# Patient Record
Sex: FEMALE | Race: WHITE | URBAN - METROPOLITAN AREA
[De-identification: names, ages, dates, MRNs, and addresses within clinical notes are randomized per-mention and may not be internally consistent; named-entity substitution may affect disease eponyms.]

---

## 2017-09-27 ENCOUNTER — OFFICE VISIT (OUTPATIENT)
Dept: PODIATRY | Facility: CLINIC | Age: 82
End: 2017-09-27
Payer: COMMERCIAL

## 2017-09-27 VITALS
HEIGHT: 60 IN | WEIGHT: 130.6 LBS | DIASTOLIC BLOOD PRESSURE: 81 MMHG | BODY MASS INDEX: 25.64 KG/M2 | SYSTOLIC BLOOD PRESSURE: 149 MMHG | HEART RATE: 85 BPM

## 2017-09-27 DIAGNOSIS — B35.1 ONYCHOMYCOSIS: Primary | ICD-10-CM

## 2017-09-27 PROCEDURE — 99203 OFFICE O/P NEW LOW 30 MIN: CPT | Performed by: PODIATRIST

## 2017-09-27 RX ORDER — LORAZEPAM 0.5 MG/1
TABLET ORAL
COMMUNITY
Start: 2017-09-12

## 2017-09-27 RX ORDER — METOPROLOL SUCCINATE 25 MG/1
TABLET, EXTENDED RELEASE ORAL
COMMUNITY
Start: 2017-09-12

## 2017-09-27 RX ORDER — AMLODIPINE BESYLATE 5 MG/1
TABLET ORAL
COMMUNITY
Start: 2017-01-26

## 2017-09-27 RX ORDER — MIRTAZAPINE 15 MG/1
7.5 TABLET, FILM COATED ORAL
COMMUNITY
Start: 2016-12-29 | End: 2017-12-29

## 2017-09-27 RX ORDER — CITALOPRAM HYDROBROMIDE 20 MG/1
TABLET ORAL
COMMUNITY
Start: 2017-03-09

## 2017-09-27 NOTE — MR AVS SNAPSHOT
After Visit Summary   9/27/2017    Carly Buckley    MRN: [id number]           Patient Information     Date Of Birth          4/5/1930        Visit Information        Provider Department      9/27/2017 1:00 PM Berto Stewart DPM [facility] [place]    FOOT CARE NURSES  If you are interested in having a foot care nurse come out to your   home, please call one of these contacts for more information:  [facility]  [phone] [facility]  [phone]   [facility]  [phone]  [facility] [phone]  [place]   [place] [facility]  [phone]  At [facility] [phone]       NAIL FUNGUS / ONYCHOMYCOSIS   Nail fungus is not a hygiene problem and will not likely lead to significant medical   problems. The nails may get thick causing pain and possibly local skin infection.   Treatments include debridement (trimming), oral antifungals, topical antifungals and complete removal of the nail. Most fungal nails are not treated.   Topicals such as tea tree oil can be helpful for surface fungus and may, at best, limit   progression. Over the counter creams (such as Lamisil) can also be used however, their effectiveness is also quite low.  Topical treatment with Pen lac is expensive and often not covered by insurance. Pen lac has an approximate 8% success rate. Topical therapy recommendations is to apply twice a day for at least 3-4 months as it takes 9 months for new nail to grow out.    Experts suggest soaking your feet for 15 to 20 minutes in a mixture of 1 cup vinegar to 4 cups warm water. Be sure to rinse well and pat your feet dry when you're done. You can soak your feet like this daily. But if your skin becomes irritated, try soaking only two to three times a week. Vicks VapoRub, as with vinegar, there have been no controlled clinical trials to assess the effectiveness of Vicks VapoRub  "on nail fungus, but there have been numerous anecdotal reports that it works. There's no consensus on how often to apply this product, so check with your doctor before using it on your nails.     Oral therapies include Sporanox and Lamisil. Oral therapies are also expensive and not very effective. Side effects such as liver disease are the main concern. Return of fungus is common even if the treatment worked.     Other Tips:  - Penlac nail medication apply daily x 4 months; remove old polish first day of each week  - Antifungal cream/powder (Zeasorb) - apply daily to feet and shoes x 2 months  - Clean shoes with Lysol or in washing machine every few weeks  - Rotate shoe gear; give them 24 hours to dry out between days wearing them  - Clean pair of socks in morning, clean pair in afternoon if your feet sweat  - Shower shoes used in public showers/pools            Follow-ups after your visit        Who to contact     If you have questions or need follow up information about today's clinic visit or your schedule please contact [facility] directly at [phone].  Normal or non-critical lab and imaging results will be communicated to you by [employer], letter or phone within 4 business days after the clinic has received the results. If you do not hear from us within 7 days, please contact the clinic through [employer] or phone. If you have a critical or abnormal lab result, we will notify you by phone as soon as possible.  Submit refill requests through [employer] or call your pharmacy and they will forward the refill request to us. Please allow 3 business days for your refill to be completed.          Additional Information About Your Visit        [employer] Information     [employer] lets you send messages to your doctor, view your test results, renew your prescriptions, schedule appointments and more. To sign up, go to [email or web address] . Click on \"Log in\" on the left side of the screen, which will take " "you to the Welcome page. Then click on \"Sign up Now\" on the right side of the page.     You will be asked to enter the access code listed below, as well as some personal information. Please follow the directions to create your username and password.     Your access code is: [email or web address]  Expires: 2017  1:34 PM     Your access code will  in 90 days. If you need help or a new code, please call your [place] clinic or [phone].        Care EveryWhere ID     This is your Care EveryWhere ID. This could be used by other organizations to access your [place] medical records  [id number]        Your Vitals Were     Pulse Height BMI (Body Mass Index)             85 5' (1.524 m) 25.51 kg/m2          Blood Pressure from Last 3 Encounters:   17 149/81   09 92/60   09 108/60    Weight from Last 3 Encounters:   17 130 lb 9.6 oz (59.2 kg)   01/13/10 152 lb (68.9 kg)   09 151 lb 9.6 oz (68.8 kg)              Today, you had the following     No orders found for display       Primary Care Provider Office Phone # Fax #    [facility] JOHN Contreras [phone]       XXX NO INFO FOUND XXX  XXX [place] [address]        Equal Access to Services     [facility] : Hadii lucian ku hadlaineyo Somaeve, waaxda luqadaha, qaybta kaalmada asher, carolann clements . So [facility] [phone].    ATENCIÓN: Si habla español, tiene a jones disposición servicios gratuitos de asistencia lingüística. Amina al [phone].    We comply with applicable federal civil rights laws and [place] laws. We do not discriminate on the basis of race, color, national origin, age, disability sex, sexual orientation or gender identity.            Thank you!     Thank you for choosing [facility]  for your care. Our goal is always to provide you with excellent care. Hearing back from our patients is one way we can continue to improve our services. Please take a few minutes to complete the " written survey that you may receive in the mail after your visit with us. Thank you!             Your Updated Medication List - Protect others around you: Learn how to safely use, store and throw away your medicines at www.disposemymeds.org.          This list is accurate as of: 9/27/17  1:34 PM.  Always use your most recent med list.                   Brand Name Dispense Instructions for use Diagnosis    amLODIPine 5 MG tablet    NORVASC     TAKE 1 TABLET DAILY        aspirin 81 MG tablet      Take one tablet by mouth every day.        CALCIUM + D PO      1 tablet daily        citalopram 20 MG tablet    celeXA     TAKE ONE TABLET BY MOUTH EVERY DAY        CLONIDINE HCL PO      1 tablet daily        LORazepam 0.5 MG tablet    ATIVAN     1 tab up to twice daily as needed for anxiety and insomnia        metoprolol 25 MG 24 hr tablet    TOPROL-XL     TAKE 1 TABLET DAILY        mirtazapine 15 MG tablet    REMERON     Take 7.5 mg by mouth        PRILOSEC PO      1 tablet daily        ZOCOR PO      1 tablet daily

## 2017-09-27 NOTE — LETTER
9/27/2017         RE: Carly Buckley  [address]  [place] [address]        Dear Colleague,    Thank you for referring your patient, Carly Buckley, to the [facility]. Please see a copy of my visit note below.    PATIENT HISTORY:  Carly Buckley is a 87 year old female who presents to clinic for a painful thick toenail on right 1st toe.  Painful only in shoes.  Her shoes are quite tight.  4-5/10 pain.  She has access to foot RN care services but hasn't tried them yet.  Hx of R hallux nail removal in 2009 with Dr. Simpson.  Since then the nail has grown back even thicker.  No other treatments.  6 months of increased pain.      Review of Systems:  Patient denies fever, chills, rash, wound, stiffness, numbness, weakness, heart burn, blood in stool, chest pain with activity, calf pain when walking, chronic cough, easy bleeding/bruising, swelling of ankles, excessive thirst.  Patient admits to limping, SOB with activity, fatigue, depression, anxiety.     PAST MEDICAL HISTORY:   HTN, Alcohol dependency, anxiety & depression, arthritis, breast Ca    PAST SURGICAL HISTORY:  Nail removal in 2009     MEDICATIONS:   Current Outpatient Prescriptions:      metoprolol (TOPROL-XL) 25 MG 24 hr tablet, TAKE 1 TABLET DAILY, Disp: , Rfl:      amLODIPine (NORVASC) 5 MG tablet, TAKE 1 TABLET DAILY, Disp: , Rfl:      citalopram (CELEXA) 20 MG tablet, TAKE ONE TABLET BY MOUTH EVERY DAY, Disp: , Rfl:      aspirin 81 MG tablet, Take one tablet by mouth every day. , Disp: , Rfl:      LORazepam (ATIVAN) 0.5 MG tablet, 1 tab up to twice daily as needed for anxiety and insomnia, Disp: , Rfl:      mirtazapine (REMERON) 15 MG tablet, Take 7.5 mg by mouth, Disp: , Rfl:      CLONIDINE HCL OR, 1 tablet daily, Disp: , Rfl:      PRILOSEC OR, 1 tablet daily, Disp: , Rfl:      ZOCOR OR, 1 tablet daily, Disp: , Rfl:      CALCIUM + D OR, 1 tablet daily, Disp: , Rfl:      ALLERGIES:    Allergies   Allergen Reactions      Tetanus Toxoid         SOCIAL HISTORY:   Social History     Social History     Marital status:      Spouse name: N/A     Number of children: N/A     Years of education: N/A     Occupational History     Not on file.     Social History Main Topics     Smoking status: Never Smoker     Smokeless tobacco: Not on file     Alcohol use No      Comment: not since 1968     Drug use: No     Sexual activity: Not on file     Other Topics Concern     Not on file     Social History Narrative        FAMILY HISTORY: No pertinent family history.     EXAM:Vitals: /81 (BP Location: Left arm, Patient Position: Chair, Cuff Size: Adult Regular)  Pulse 85  Ht 5' (1.524 m)  Wt 130 lb 9.6 oz (59.2 kg)  BMI 25.51 kg/m2  BMI= Body mass index is 25.51 kg/(m^2).    General appearance: Patient is alert and fully cooperative with history & exam.  No sign of distress is noted during the visit.     Psychiatric: Affect is pleasant & appropriate.  Patient appears motivated to improve health.     Respiratory: Breathing is regular & unlabored while sitting.     HEENT: Hearing is intact to spoken word.  Speech is clear.  No gross evidence of visual impairment that would impact ambulation.    Pt declined L foot exam.     Dermatologic: Right toenails 1-5 thickened, dystrophic, discolored.  Hallux nail extremely thick.  3rd toenail curving back nearly penetrating skin.  No paronychia or evidence of soft tissue infection is noted.     Vascular: DP & PT pulses are intact & regular on the right.  No significant edema or varicosities noted.  CFT and skin temperature are normal.     Neurologic: Lower extremity sensation is intact to light touch.  No evidence of weakness or contracture in the lower extremities.  No evidence of neuropathy.     Musculoskeletal: Patient is ambulatory.  No gross ankle deformity noted.  No foot or ankle joint effusion is noted.     ASSESSMENT: Onychomycosis     PLAN:  Reviewed patient's chart in epic.  Discussed  condition and treatment options including pros and cons.    Discussed causes of nail fungus.  Discussed treatment options with patient and explained that there isn't one treatment that is 100% effective.  Debridement is an option.  Discussed oral lamisil which is the most effective can have liver effects.  Discussed over the counter antifungal creams.  Explained that these are less effective and need to be applied twice a day for about 3-4 months.  Also talked about prescription topicals, which have similar efficacy.  Also discussed that if there was damage to the nail and the nail is now dystrophic that none of the above is going to change the nail.  Discussed permanent nail removal is an option.  Discussed risk of infection, wound healing issues.    Debridement was offered.  Discussed I normally don't do routine foot care.  She decided to get nails debrided by foot care RN since she is not sure if this is covered for her.  List of routine care options given.  I did debride the right 3rd toenail with a nail nipper to prevent an ulceration here.  No charge.  Roomier shoes advised.    F/u prn.    Handouts given.    Berto Stewart DPM, FACFAS      BMI is normal.    STORMY Carrasquillo MA September 27, 2017 1:18 PM      Again, thank you for allowing me to participate in the care of your patient.        Sincerely,        Berto Stewart DPM

## 2017-09-27 NOTE — NURSING NOTE
Chief Complaint   Patient presents with     Toenail     R 1st toenail        Initial /81 (BP Location: Left arm, Patient Position: Chair, Cuff Size: Adult Regular)  Pulse 85  Ht 5' (1.524 m)  Wt 130 lb 9.6 oz (59.2 kg)  BMI 25.51 kg/m2 Estimated body mass index is 25.51 kg/(m^2) as calculated from the following:    Height as of this encounter: 5' (1.524 m).    Weight as of this encounter: 130 lb 9.6 oz (59.2 kg).  Medication Reconciliation: unable or not appropriate to perform    ALe Carrasquillo MA September 27, 2017 1:18 PM

## 2017-09-27 NOTE — PATIENT INSTRUCTIONS
FOOT CARE NURSES  If you are interested in having a foot care nurse come out to your   home, please call one of these contacts for more information:  [facility]  [phone] [facility]  [phone]   [facility]  [phone]  [facility] [phone]  [place]   [place] [facility]  [phone]  At [facility] [phone]       NAIL FUNGUS / ONYCHOMYCOSIS   Nail fungus is not a hygiene problem and will not likely lead to significant medical   problems. The nails may get thick causing pain and possibly local skin infection.   Treatments include debridement (trimming), oral antifungals, topical antifungals and complete removal of the nail. Most fungal nails are not treated.   Topicals such as tea tree oil can be helpful for surface fungus and may, at best, limit   progression. Over the counter creams (such as Lamisil) can also be used however, their effectiveness is also quite low.  Topical treatment with Pen lac is expensive and often not covered by insurance. Pen lac has an approximate 8% success rate. Topical therapy recommendations is to apply twice a day for at least 3-4 months as it takes 9 months for new nail to grow out.    Experts suggest soaking your feet for 15 to 20 minutes in a mixture of 1 cup vinegar to 4 cups warm water. Be sure to rinse well and pat your feet dry when you're done. You can soak your feet like this daily. But if your skin becomes irritated, try soaking only two to three times a week. Vicks VapoRub, as with vinegar, there have been no controlled clinical trials to assess the effectiveness of Vicks VapoRub on nail fungus, but there have been numerous anecdotal reports that it works. There's no consensus on how often to apply this product, so check with your doctor before using it on your nails.     Oral therapies include Sporanox and Lamisil. Oral therapies are also expensive and not very effective. Side  effects such as liver disease are the main concern. Return of fungus is common even if the treatment worked.     Other Tips:  - Penlac nail medication apply daily x 4 months; remove old polish first day of each week  - Antifungal cream/powder (Zeasorb) - apply daily to feet and shoes x 2 months  - Clean shoes with Lysol or in washing machine every few weeks  - Rotate shoe gear; give them 24 hours to dry out between days wearing them  - Clean pair of socks in morning, clean pair in afternoon if your feet sweat  - Shower shoes used in public showers/pools

## 2017-09-27 NOTE — PROGRESS NOTES
PATIENT HISTORY:  Carly Buckley is a 87 year old female who presents to clinic for a painful thick toenail on right 1st toe.  Painful only in shoes.  Her shoes are quite tight.  4-5/10 pain.  She has access to foot RN care services but hasn't tried them yet.  Hx of R hallux nail removal in 2009 with Dr. Simpson.  Since then the nail has grown back even thicker.  No other treatments.  6 months of increased pain.      Review of Systems:  Patient denies fever, chills, rash, wound, stiffness, numbness, weakness, heart burn, blood in stool, chest pain with activity, calf pain when walking, chronic cough, easy bleeding/bruising, swelling of ankles, excessive thirst.  Patient admits to limping, SOB with activity, fatigue, depression, anxiety.     PAST MEDICAL HISTORY:   HTN, Alcohol dependency, anxiety & depression, arthritis, breast Ca    PAST SURGICAL HISTORY:  Nail removal in 2009     MEDICATIONS:   Current Outpatient Prescriptions:      metoprolol (TOPROL-XL) 25 MG 24 hr tablet, TAKE 1 TABLET DAILY, Disp: , Rfl:      amLODIPine (NORVASC) 5 MG tablet, TAKE 1 TABLET DAILY, Disp: , Rfl:      citalopram (CELEXA) 20 MG tablet, TAKE ONE TABLET BY MOUTH EVERY DAY, Disp: , Rfl:      aspirin 81 MG tablet, Take one tablet by mouth every day. , Disp: , Rfl:      LORazepam (ATIVAN) 0.5 MG tablet, 1 tab up to twice daily as needed for anxiety and insomnia, Disp: , Rfl:      mirtazapine (REMERON) 15 MG tablet, Take 7.5 mg by mouth, Disp: , Rfl:      CLONIDINE HCL OR, 1 tablet daily, Disp: , Rfl:      PRILOSEC OR, 1 tablet daily, Disp: , Rfl:      ZOCOR OR, 1 tablet daily, Disp: , Rfl:      CALCIUM + D OR, 1 tablet daily, Disp: , Rfl:      ALLERGIES:    Allergies   Allergen Reactions     Tetanus Toxoid         SOCIAL HISTORY:   Social History     Social History     Marital status:      Spouse name: N/A     Number of children: N/A     Years of education: N/A     Occupational History     Not on file.     Social History Main Topics      Smoking status: Never Smoker     Smokeless tobacco: Not on file     Alcohol use No      Comment: not since 1968     Drug use: No     Sexual activity: Not on file     Other Topics Concern     Not on file     Social History Narrative        FAMILY HISTORY: No pertinent family history.     EXAM:Vitals: /81 (BP Location: Left arm, Patient Position: Chair, Cuff Size: Adult Regular)  Pulse 85  Ht 5' (1.524 m)  Wt 130 lb 9.6 oz (59.2 kg)  BMI 25.51 kg/m2  BMI= Body mass index is 25.51 kg/(m^2).    General appearance: Patient is alert and fully cooperative with history & exam.  No sign of distress is noted during the visit.     Psychiatric: Affect is pleasant & appropriate.  Patient appears motivated to improve health.     Respiratory: Breathing is regular & unlabored while sitting.     HEENT: Hearing is intact to spoken word.  Speech is clear.  No gross evidence of visual impairment that would impact ambulation.    Pt declined L foot exam.     Dermatologic: Right toenails 1-5 thickened, dystrophic, discolored.  Hallux nail extremely thick.  3rd toenail curving back nearly penetrating skin.  No paronychia or evidence of soft tissue infection is noted.     Vascular: DP & PT pulses are intact & regular on the right.  No significant edema or varicosities noted.  CFT and skin temperature are normal.     Neurologic: Lower extremity sensation is intact to light touch.  No evidence of weakness or contracture in the lower extremities.  No evidence of neuropathy.     Musculoskeletal: Patient is ambulatory.  No gross ankle deformity noted.  No foot or ankle joint effusion is noted.     ASSESSMENT: Onychomycosis     PLAN:  Reviewed patient's chart in epic.  Discussed condition and treatment options including pros and cons.    Discussed causes of nail fungus.  Discussed treatment options with patient and explained that there isn't one treatment that is 100% effective.  Debridement is an option.  Discussed oral lamisil  which is the most effective can have liver effects.  Discussed over the counter antifungal creams.  Explained that these are less effective and need to be applied twice a day for about 3-4 months.  Also talked about prescription topicals, which have similar efficacy.  Also discussed that if there was damage to the nail and the nail is now dystrophic that none of the above is going to change the nail.  Discussed permanent nail removal is an option.  Discussed risk of infection, wound healing issues.    Debridement was offered.  Discussed I normally don't do routine foot care.  She decided to get nails debrided by foot care RN since she is not sure if this is covered for her.  List of routine care options given.  I did debride the right 3rd toenail with a nail nipper to prevent an ulceration here.  No charge.  Roomier shoes advised.    F/u prn.    Handouts given.    Berto Stewart, JUANITO, FACFAS

## 2022-01-01 ENCOUNTER — LAB REQUISITION (OUTPATIENT)
Dept: LAB | Facility: CLINIC | Age: 87
End: 2022-01-01
Payer: COMMERCIAL

## 2022-01-01 DIAGNOSIS — I10 ESSENTIAL (PRIMARY) HYPERTENSION: ICD-10-CM

## 2022-01-01 DIAGNOSIS — G30.9 ALZHEIMER'S DISEASE, UNSPECIFIED (CODE) (H): ICD-10-CM

## 2022-01-01 DIAGNOSIS — R30.0 DYSURIA: ICD-10-CM

## 2022-01-01 LAB
ALBUMIN SERPL BCG-MCNC: 3.6 G/DL (ref 3.5–5.2)
ALBUMIN UR-MCNC: 300 MG/DL
ALP SERPL-CCNC: 58 U/L (ref 35–104)
ALT SERPL W P-5'-P-CCNC: 15 U/L (ref 10–35)
ANION GAP SERPL CALCULATED.3IONS-SCNC: 11 MMOL/L (ref 7–15)
APPEARANCE UR: CLEAR
AST SERPL W P-5'-P-CCNC: 34 U/L (ref 10–35)
BILIRUB SERPL-MCNC: 0.3 MG/DL
BILIRUB UR QL STRIP: NEGATIVE
BUN SERPL-MCNC: 33.7 MG/DL (ref 8–23)
CALCIUM SERPL-MCNC: 9.2 MG/DL (ref 8.2–9.6)
CHLORIDE SERPL-SCNC: 105 MMOL/L (ref 98–107)
COLOR UR AUTO: ABNORMAL
CREAT SERPL-MCNC: 1.34 MG/DL (ref 0.51–0.95)
DEPRECATED CALCIDIOL+CALCIFEROL SERPL-MC: 27 UG/L (ref 20–75)
DEPRECATED HCO3 PLAS-SCNC: 23 MMOL/L (ref 22–29)
ERYTHROCYTE [DISTWIDTH] IN BLOOD BY AUTOMATED COUNT: 13.9 % (ref 10–15)
GFR SERPL CREATININE-BSD FRML MDRD: 37 ML/MIN/1.73M2
GLUCOSE SERPL-MCNC: 76 MG/DL (ref 70–99)
GLUCOSE UR STRIP-MCNC: NEGATIVE MG/DL
HCT VFR BLD AUTO: 36.9 % (ref 35–47)
HGB BLD-MCNC: 11.9 G/DL (ref 11.7–15.7)
HGB UR QL STRIP: NEGATIVE
HYALINE CASTS: 1 /LPF
KETONES UR STRIP-MCNC: NEGATIVE MG/DL
LEUKOCYTE ESTERASE UR QL STRIP: NEGATIVE
MCH RBC QN AUTO: 30.7 PG (ref 26.5–33)
MCHC RBC AUTO-ENTMCNC: 32.2 G/DL (ref 31.5–36.5)
MCV RBC AUTO: 95 FL (ref 78–100)
MUCOUS THREADS #/AREA URNS LPF: PRESENT /LPF
NITRATE UR QL: NEGATIVE
PH UR STRIP: 6.5 [PH] (ref 5–7)
PLATELET # BLD AUTO: 227 10E3/UL (ref 150–450)
POTASSIUM SERPL-SCNC: 3.9 MMOL/L (ref 3.4–5.3)
PROT SERPL-MCNC: 6.9 G/DL (ref 6.4–8.3)
RBC # BLD AUTO: 3.88 10E6/UL (ref 3.8–5.2)
RBC URINE: 1 /HPF
SODIUM SERPL-SCNC: 139 MMOL/L (ref 136–145)
SP GR UR STRIP: 1.02 (ref 1–1.03)
SQUAMOUS EPITHELIAL: 2 /HPF
TSH SERPL DL<=0.005 MIU/L-ACNC: 2.5 UIU/ML (ref 0.3–4.2)
UROBILINOGEN UR STRIP-MCNC: NORMAL MG/DL
VIT B12 SERPL-MCNC: 341 PG/ML (ref 232–1245)
WBC # BLD AUTO: 4.3 10E3/UL (ref 4–11)
WBC URINE: 3 /HPF

## 2022-01-01 PROCEDURE — 81001 URINALYSIS AUTO W/SCOPE: CPT | Mod: ORL | Performed by: FAMILY MEDICINE

## 2022-01-01 PROCEDURE — 82607 VITAMIN B-12: CPT | Mod: ORL | Performed by: FAMILY MEDICINE

## 2022-01-01 PROCEDURE — 84443 ASSAY THYROID STIM HORMONE: CPT | Mod: ORL | Performed by: FAMILY MEDICINE

## 2022-01-01 PROCEDURE — 85027 COMPLETE CBC AUTOMATED: CPT | Mod: ORL | Performed by: FAMILY MEDICINE

## 2022-01-01 PROCEDURE — 82306 VITAMIN D 25 HYDROXY: CPT | Mod: ORL | Performed by: FAMILY MEDICINE

## 2022-01-01 PROCEDURE — P9604 ONE-WAY ALLOW PRORATED TRIP: HCPCS | Mod: ORL | Performed by: FAMILY MEDICINE

## 2022-01-01 PROCEDURE — 36415 COLL VENOUS BLD VENIPUNCTURE: CPT | Mod: ORL | Performed by: FAMILY MEDICINE

## 2022-01-01 PROCEDURE — 80053 COMPREHEN METABOLIC PANEL: CPT | Mod: ORL | Performed by: FAMILY MEDICINE
